# Patient Record
Sex: MALE | Race: WHITE | Employment: FULL TIME | URBAN - METROPOLITAN AREA
[De-identification: names, ages, dates, MRNs, and addresses within clinical notes are randomized per-mention and may not be internally consistent; named-entity substitution may affect disease eponyms.]

---

## 2017-01-31 DIAGNOSIS — J45.30 MILD PERSISTENT ASTHMA, UNCOMPLICATED: Primary | ICD-10-CM

## 2017-01-31 RX ORDER — DEXAMETHASONE 4 MG/1
TABLET ORAL
Qty: 1 INHALER | Refills: 0 | Status: SHIPPED | OUTPATIENT
Start: 2017-01-31 | End: 2017-02-08

## 2017-01-31 NOTE — TELEPHONE ENCOUNTER
Flovent inhaler       Last Written Prescription Date: 10-18-16  Last Fill Quantity: 1, # refills: 0    Last Office Visit with G, P or [facility] prescribing provider:  10-16-15   Future Office Visit:       Date of Last Asthma Action Plan Letter:   Asthma Action Plan Q1 Year    Topic Date Due     Asthma Action Plan - yearly  10/23/2015      Asthma Control Test:   ACT Total Scores 10/16/2015   ACT TOTAL SCORE -   ASTHMA ER VISITS -   ASTHMA HOSPITALIZATIONS -   ACT TOTAL SCORE (Goal Greater than or Equal to 20) 16   In the past 12 months, how many times did you visit the emergency room for your asthma without being admitted to the hospital? 0   In the past 12 months, how many times were you hospitalized overnight because of your asthma? 0       Date of Last Spirometry Test:   No results found for this or any previous visit.

## 2017-01-31 NOTE — TELEPHONE ENCOUNTER
Noted in 10/16/2016 TE by SN that pt needs appointment.  Spoke with pt, appt scheduled:    Next 5 appointments (look out 90 days)     Feb 08, 2017  8:30 AM   Office Visit with Jessika Morales MD   [facility] ([facility])    [address] [facility] [address]   [phone]                Medication is being filled for 1 time refill only due to:  upcoming appt   Sherly SANTANA RN

## 2017-02-08 ENCOUNTER — OFFICE VISIT (OUTPATIENT)
Dept: FAMILY MEDICINE | Facility: CLINIC | Age: 42
End: 2017-02-08
Payer: COMMERCIAL

## 2017-02-08 VITALS
HEART RATE: 76 BPM | DIASTOLIC BLOOD PRESSURE: 104 MMHG | HEIGHT: 68 IN | TEMPERATURE: 96.3 F | SYSTOLIC BLOOD PRESSURE: 142 MMHG | BODY MASS INDEX: 28.43 KG/M2 | OXYGEN SATURATION: 96 % | WEIGHT: 187.6 LBS | RESPIRATION RATE: 12 BRPM

## 2017-02-08 DIAGNOSIS — F10.930 ALCOHOL WITHDRAWAL, UNCOMPLICATED (H): ICD-10-CM

## 2017-02-08 DIAGNOSIS — Z00.01 ENCOUNTER FOR ROUTINE ADULT MEDICAL EXAM WITH ABNORMAL FINDINGS: Primary | ICD-10-CM

## 2017-02-08 DIAGNOSIS — R03.0 ELEVATED BLOOD PRESSURE READING WITHOUT DIAGNOSIS OF HYPERTENSION: ICD-10-CM

## 2017-02-08 DIAGNOSIS — R79.89 ELEVATED LFTS: ICD-10-CM

## 2017-02-08 DIAGNOSIS — F41.9 ANXIETY: ICD-10-CM

## 2017-02-08 DIAGNOSIS — J45.30 MILD PERSISTENT ASTHMA, UNCOMPLICATED: ICD-10-CM

## 2017-02-08 DIAGNOSIS — F33.0 MAJOR DEPRESSIVE DISORDER, RECURRENT EPISODE, MILD (H): ICD-10-CM

## 2017-02-08 DIAGNOSIS — F10.288 ALCOHOL DEPENDENCE WITH OTHER ALCOHOL-INDUCED DISORDER (H): ICD-10-CM

## 2017-02-08 DIAGNOSIS — Z23 FLU VACCINE NEED: ICD-10-CM

## 2017-02-08 LAB
ALBUMIN SERPL-MCNC: 4 G/DL (ref 3.4–5)
ALP SERPL-CCNC: 94 U/L (ref 40–150)
ALT SERPL W P-5'-P-CCNC: 147 U/L (ref 0–70)
ANION GAP SERPL CALCULATED.3IONS-SCNC: 8 MMOL/L (ref 3–14)
AST SERPL W P-5'-P-CCNC: 50 U/L (ref 0–45)
BILIRUB SERPL-MCNC: 0.4 MG/DL (ref 0.2–1.3)
BUN SERPL-MCNC: 15 MG/DL (ref 7–30)
CALCIUM SERPL-MCNC: 9.3 MG/DL (ref 8.5–10.1)
CHLORIDE SERPL-SCNC: 103 MMOL/L (ref 94–109)
CO2 SERPL-SCNC: 29 MMOL/L (ref 20–32)
CREAT SERPL-MCNC: 0.71 MG/DL (ref 0.66–1.25)
GFR SERPL CREATININE-BSD FRML MDRD: ABNORMAL ML/MIN/1.7M2
GGT SERPL-CCNC: 133 U/L (ref 0–75)
GLUCOSE SERPL-MCNC: 107 MG/DL (ref 70–99)
POTASSIUM SERPL-SCNC: 4 MMOL/L (ref 3.4–5.3)
PROT SERPL-MCNC: 7.8 G/DL (ref 6.8–8.8)
SODIUM SERPL-SCNC: 140 MMOL/L (ref 133–144)

## 2017-02-08 PROCEDURE — 90471 IMMUNIZATION ADMIN: CPT | Performed by: FAMILY MEDICINE

## 2017-02-08 PROCEDURE — 99396 PREV VISIT EST AGE 40-64: CPT | Mod: 25 | Performed by: FAMILY MEDICINE

## 2017-02-08 PROCEDURE — 80053 COMPREHEN METABOLIC PANEL: CPT | Performed by: FAMILY MEDICINE

## 2017-02-08 PROCEDURE — 99213 OFFICE O/P EST LOW 20 MIN: CPT | Mod: 25 | Performed by: FAMILY MEDICINE

## 2017-02-08 PROCEDURE — 36415 COLL VENOUS BLD VENIPUNCTURE: CPT | Performed by: FAMILY MEDICINE

## 2017-02-08 PROCEDURE — 90686 IIV4 VACC NO PRSV 0.5 ML IM: CPT | Performed by: FAMILY MEDICINE

## 2017-02-08 PROCEDURE — 82977 ASSAY OF GGT: CPT | Performed by: FAMILY MEDICINE

## 2017-02-08 RX ORDER — FLUTICASONE PROPIONATE 110 UG/1
1 AEROSOL, METERED RESPIRATORY (INHALATION) 2 TIMES DAILY
Qty: 1 INHALER | Refills: 11 | Status: SHIPPED | OUTPATIENT
Start: 2017-02-08 | End: 2017-09-15

## 2017-02-08 RX ORDER — DIAZEPAM 5 MG
2.5 TABLET ORAL DAILY PRN
Qty: 7 TABLET | Refills: 0 | Status: SHIPPED | OUTPATIENT
Start: 2017-02-08 | End: 2017-09-15

## 2017-02-08 RX ORDER — CITALOPRAM HYDROBROMIDE 20 MG/1
20 TABLET ORAL DAILY
Qty: 90 TABLET | Refills: 1 | Status: SHIPPED | OUTPATIENT
Start: 2017-02-08 | End: 2017-08-12

## 2017-02-08 ASSESSMENT — ANXIETY QUESTIONNAIRES
6. BECOMING EASILY ANNOYED OR IRRITABLE: SEVERAL DAYS
5. BEING SO RESTLESS THAT IT IS HARD TO SIT STILL: NOT AT ALL
IF YOU CHECKED OFF ANY PROBLEMS ON THIS QUESTIONNAIRE, HOW DIFFICULT HAVE THESE PROBLEMS MADE IT FOR YOU TO DO YOUR WORK, TAKE CARE OF THINGS AT HOME, OR GET ALONG WITH OTHER PEOPLE: NOT DIFFICULT AT ALL
7. FEELING AFRAID AS IF SOMETHING AWFUL MIGHT HAPPEN: SEVERAL DAYS
1. FEELING NERVOUS, ANXIOUS, OR ON EDGE: SEVERAL DAYS
2. NOT BEING ABLE TO STOP OR CONTROL WORRYING: SEVERAL DAYS
GAD7 TOTAL SCORE: 6
3. WORRYING TOO MUCH ABOUT DIFFERENT THINGS: SEVERAL DAYS

## 2017-02-08 ASSESSMENT — PATIENT HEALTH QUESTIONNAIRE - PHQ9: 5. POOR APPETITE OR OVEREATING: SEVERAL DAYS

## 2017-02-08 NOTE — NURSING NOTE
"Chief Complaint   Patient presents with     Physical     not fasting     /104 mmHg  Pulse 76  Temp(Src) 96.3  F (35.7  C) (Oral)  Resp 12  Ht 5' 7.75\" (1.721 m)  Wt 187 lb 9.6 oz (85.095 kg)  BMI 28.73 kg/m2  SpO2 96% Estimated body mass index is 28.73 kg/(m^2) as calculated from the following:    Height as of this encounter: 5' 7.75\" (1.721 m).    Weight as of this encounter: 187 lb 9.6 oz (85.095 kg).  bp completed using cuff size: large      Health Maintenance that is potentially due pending provider review:  PHQ9 and ACT    Possibly completing today per provider review.  Mala Marquez M.A.    "
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
## 2017-02-08 NOTE — MR AVS SNAPSHOT
After Visit Summary   2/8/2017    Garrett Hays    MRN: [id number]           Patient Information     Date Of Birth          1975        Visit Information        Provider Department      2/8/2017 8:30 AM Jessika Morales MD [facility]        Today's Diagnoses     Encounter for routine adult medical exam with abnormal findings    -  1     Mild persistent asthma, uncomplicated         Alcohol dependence with other alcohol-induced disorder (H)         Elevated LFTs         Anxiety         Major depressive disorder, recurrent episode, mild (H)         Tobacco user         Seizure disorder (H)         Alcohol withdrawal, uncomplicated (H)           Care Instructions      Preventive Health Recommendations  Male Ages 40 to 49    Yearly exam:             See your health care provider every year in order to  o   Review health changes.   o   Discuss preventive care.    o   Review your medicines if your doctor has prescribed any.    You should be tested each year for STDs (sexually transmitted diseases) if you re at risk.     Have a cholesterol test every 5 years.     Have a colonoscopy (test for colon cancer) if someone in your family has had colon cancer or polyps before age 50.     After age 45, have a diabetes test (fasting glucose). If you are at risk for diabetes, you should have this test every 3 years.      Talk with your health care provider about whether or not a prostate cancer screening test (PSA) is right for you.    Shots: Get a flu shot each year. Get a tetanus shot every 10 years.     Nutrition:    Eat at least 5 servings of fruits and vegetables daily.     Eat whole-grain bread, whole-wheat pasta and brown rice instead of white grains and rice.     Talk to your provider about Calcium and Vitamin D.     Lifestyle    Exercise for at least 150 minutes a week (30 minutes a day, 5 days a week). This will help you control your weight and prevent disease.     Limit alcohol to one  "drink per day.     No smoking.     Wear sunscreen to prevent skin cancer.     See your dentist every six months for an exam and cleaning.            Follow-ups after your visit        Who to contact     If you have questions or need follow up information about today's clinic visit or your schedule please contact [facility] directly at [phone].  Normal or non-critical lab and imaging results will be communicated to you by MyChart, letter or phone within 4 business days after the clinic has received the results. If you do not hear from us within 7 days, please contact the clinic through Jett or phone. If you have a critical or abnormal lab result, we will notify you by phone as soon as possible.  Submit refill requests through Piccs[employer] or call your pharmacy and they will forward the refill request to us. Please allow 3 business days for your refill to be completed.          Additional Information About Your Visit        [employer] Information     Piccsy gives you secure access to your electronic health record. If you see a primary care provider, you can also send messages to your care team and make appointments. If you have questions, please call your primary care clinic.  If you do not have a primary care provider, please call [phone] and they will assist you.        Care EveryWhere ID     This is your Care EveryWhere ID. This could be used by other organizations to access your [facility] medical records  [id number]        Your Vitals Were     Pulse Temperature Respirations Height BMI (Body Mass Index) Pulse Oximetry    76 96.3  F (35.7  C) (Oral) 12 5' 7.75\" (1.721 m) 28.73 kg/m2 96%       Blood Pressure from Last 3 Encounters:   02/08/17 142/104   10/16/15 124/74   05/01/15 128/80    Weight from Last 3 Encounters:   02/08/17 187 lb 9.6 oz (85.095 kg)   10/16/15 186 lb 4.8 oz (84.505 kg)   05/01/15 187 lb 8 oz (85.049 kg)              We Performed the Following     Asthma Action Plan (AAP)     " Comprehensive metabolic panel (BMP + Alb, Alk Phos, ALT, AST, Total. Bili, TP)     DEPRESSION ACTION PLAN (DAP)          Today's Medication Changes          These changes are accurate as of: 2/8/17  9:29 AM.  If you have any questions, ask your nurse or doctor.               These medicines have changed or have updated prescriptions.        Dose/Directions    citalopram 20 MG tablet   Commonly known as:  celeXA   This may have changed:  See the new instructions.   Used for:  Anxiety   Changed by:  Jessika Morales MD        Dose:  20 mg   Take 1 tablet (20 mg) by mouth daily   Quantity:  90 tablet   Refills:  1       fluticasone 110 MCG/ACT Inhaler   Commonly known as:  FLOVENT HFA   This may have changed:  See the new instructions.   Used for:  Mild persistent asthma, uncomplicated   Changed by:  Jessika Morales MD        Dose:  1 puff   Inhale 1 puff into the lungs 2 times daily   Quantity:  1 Inhaler   Refills:  11            Where to get your medicines      These medications were sent to [facility] [id number] [facility] - [place], [place] - [address]  [address] [address]     Phone:  [phone]    - citalopram 20 MG tablet  - fluticasone 110 MCG/ACT Inhaler      Some of these will need a paper prescription and others can be bought over the counter.  Ask your nurse if you have questions.     Bring a paper prescription for each of these medications    - diazepam 5 MG tablet             Primary Care Provider Office Phone # Fax #    Megan Carranza [phone]       [facility] [address] [id number]        Thank you!     Thank you for choosing [facility]  for your care. Our goal is always to provide you with excellent care. Hearing back from our patients is one way we can continue to improve our services. Please take a few minutes to complete the written survey that you may receive in the mail after your  visit with us. Thank you!             Your Updated Medication List - Protect others around you: Learn how to safely use, store and throw away your medicines at www.disposemymeds.org.          This list is accurate as of: 2/8/17  9:29 AM.  Always use your most recent med list.                   Brand Name Dispense Instructions for use    citalopram 20 MG tablet    celeXA    90 tablet    Take 1 tablet (20 mg) by mouth daily       diazepam 5 MG tablet    VALIUM    7 tablet    Take 0.5 tablets (2.5 mg) by mouth daily as needed for anxiety or agitation       EPINEPHrine 0.3 MG/0.3ML injection     0.6 mL    Inject 0.3 mLs (0.3 mg) into the muscle as needed for anaphylaxis       fluticasone 110 MCG/ACT Inhaler    FLOVENT HFA    1 Inhaler    Inhale 1 puff into the lungs 2 times daily       omeprazole 20 MG tablet     90 tablet    Take 1 tablet (20 mg) by mouth daily Take 30-60 minutes before a meal.       VENTOLIN  (90 BASE) MCG/ACT Inhaler   Generic drug:  albuterol     1 Inhaler    INHALE ONE OR TWO PUFFS BY MOUTH EVERY SIX HOURS AS NEEDED FOR SHORTNESS OF BREATH

## 2017-02-09 ASSESSMENT — ANXIETY QUESTIONNAIRES: GAD7 TOTAL SCORE: 6

## 2017-02-09 ASSESSMENT — ASTHMA QUESTIONNAIRES: ACT_TOTALSCORE: 24

## 2017-02-09 ASSESSMENT — PATIENT HEALTH QUESTIONNAIRE - PHQ9: SUM OF ALL RESPONSES TO PHQ QUESTIONS 1-9: 5

## 2017-02-09 NOTE — PROGRESS NOTES
Quick Note:    Here are your lab results from your recent visit...  -Your CMP (which includes electrolyte levels, blood sugar levels, and kidney and liver function tests) looks abnormal again with the elevated liver function tests (the ALT and AST). Your GGT, which is another liver test that is specifically elevated with excess alcohol ingestion, is also elevated. Your glucose is also a bit high, but that is likely because you were not fasting at the time of your lab draw?     We can discuss these further at your follow-up appointment.    Eduardo Guadarrama MD  ______

## 2017-02-10 ENCOUNTER — CARE COORDINATION (OUTPATIENT)
Dept: CARE COORDINATION | Facility: CLINIC | Age: 42
End: 2017-02-10

## 2017-02-10 NOTE — PROGRESS NOTES
Clinic Care Coordination Contact  OUTREACH    Referral Information:  Referral Source: PCP  Reason for Contact: initial with focus on patient want info on how to schedule CD assessment  Care Conference: No     Universal Utilization:   ED Visits in last year: 0  Hospital visits in last year: 0  Last PCP appointment: 02/08/17 (not with his PCP)  Missed Appointments: 0  Concerns: none  Multiple Providers or Specialists: none    Clinical Concerns:  Current Medical Concerns: Patient stopped drinking 3 days ago & has not had a drink since. Patient is feeling fine & reports that he feels better then when he has been drinking. Discussed that patient would need to go to ED if he started to feel like his heart is racing, has [place], clammy & etc that patient would need to go to ED immediately. Patient has had none of those symptoms. Patient did go through detox about 20 years ago. He is not feeling like he did at that time.    Current Behavioral Concerns: Patient does want to have support in remaining sober. He has looked at info on AA but is not sure that he wants to attend groups. Discussion that with his stopping drinking is to have CD assessment which will determine what type of program patient would benefit from. Patient would potentially need an outpt program that would be meeing 3 days per week for 3-4 hours per time. Patient does work full time so day program would not work for him. Patient can have CD assessment with [facility] & if [facility] outpt program would not work for his schedule that he will receive info on other programs that would meet his needs. Patient has not discussed with his family his decision not to drink. He stated that his family will be supportive  Education Provided to patient: Discussion of withdrawal symptoms & that to determine what type of program that he needs, that would be done by a CD assessment    Clinical Pathway Name: Depression  Clinical Pathway: not reviewed today as focus was on alcohol use &  treatment    Medication Management:  Not reviewed     Functional Status:  Mobility Status-unknown  Equipment Currently Used at Home:  (unknown)  Transportation: no issues identified           Psychosocial:  Current living arrangement:: Not Assessed  Financial/Insurance: Patient works full time  Patient will contact his health plan to verify coverage for CD assessment     Resources and Interventions:  Current Resources: none  PAS Number:  (n/a)  [facility] Line Referral Placed:  (n/a)  Advanced Care Plans/Directives on file:: No  Referrals Placed: CD ([facility] for CD assessment)     Goals:       Patient stated that he had the info that he needed & did not want any further follow up by Clinic Care CoordinatorDONTE             Barriers: none noted  Strengths: Patient has committed to not drinking by not drinking for past 3 days  Patient/Caregiver understanding: Patient is aware that he needs CD assessment to verify what level of CD treatment her needs & that he can schedule a CD assessment with  by calling [phone]  Frequency of Care Coordination: as needed  Upcoming appointment: 02/22/17     Plan: Patient is to call  at [phone] to schedule CD assessment  Patient can outreach to Clinic Care Coordinator, DONTE in the future if he is need of additional info & resources  AZAM Weinberg, [facility]  Clinic Care Coordinator, DONTE with  [facility]  [phone]

## 2017-02-10 NOTE — PROGRESS NOTES
Clinic Care Coordination Contact  [facility]/Voicemail       Clinical Data: Care Coordinator Outreach    Patient in to be seen on 02/08/2017 by provider other then his PCP.  Patient referred related to his request for treatment option related to alcohol use.  Screening for alcohol use was 14 for this patient & anything over 8 is a disorder    Unknown if patient has done treatment in plast.  Phone # to schedule outpt CD eval is [phone]      Outreach attempted x 1.  Left message on voicemail with call back information and requested return call.  Plan: . Care Coordinator will try to reach patient again in 3-5 business days.  AZAM Weinberg, [facility]  Clinic Care Coordinator,  with [employer]  [phone]

## 2017-02-22 ENCOUNTER — OFFICE VISIT (OUTPATIENT)
Dept: FAMILY MEDICINE | Facility: CLINIC | Age: 42
End: 2017-02-22
Payer: COMMERCIAL

## 2017-02-22 VITALS
DIASTOLIC BLOOD PRESSURE: 84 MMHG | RESPIRATION RATE: 15 BRPM | HEIGHT: 68 IN | TEMPERATURE: 97 F | BODY MASS INDEX: 28.2 KG/M2 | SYSTOLIC BLOOD PRESSURE: 127 MMHG | WEIGHT: 186.1 LBS | OXYGEN SATURATION: 96 % | HEART RATE: 82 BPM

## 2017-02-22 DIAGNOSIS — R79.89 ELEVATED LFTS: ICD-10-CM

## 2017-02-22 DIAGNOSIS — F10.21 ALCOHOL DEPENDENCE IN REMISSION (H): Primary | ICD-10-CM

## 2017-02-22 LAB
ALBUMIN SERPL-MCNC: 4.3 G/DL (ref 3.4–5)
ALP SERPL-CCNC: 79 U/L (ref 40–150)
ALT SERPL W P-5'-P-CCNC: 129 U/L (ref 0–70)
ANION GAP SERPL CALCULATED.3IONS-SCNC: 8 MMOL/L (ref 3–14)
AST SERPL W P-5'-P-CCNC: 42 U/L (ref 0–45)
BILIRUB SERPL-MCNC: 0.4 MG/DL (ref 0.2–1.3)
BUN SERPL-MCNC: 16 MG/DL (ref 7–30)
CALCIUM SERPL-MCNC: 9.5 MG/DL (ref 8.5–10.1)
CHLORIDE SERPL-SCNC: 105 MMOL/L (ref 94–109)
CO2 SERPL-SCNC: 27 MMOL/L (ref 20–32)
CREAT SERPL-MCNC: 0.94 MG/DL (ref 0.66–1.25)
GFR SERPL CREATININE-BSD FRML MDRD: 89 ML/MIN/1.7M2
GGT SERPL-CCNC: 89 U/L (ref 0–75)
GLUCOSE SERPL-MCNC: 94 MG/DL (ref 70–99)
POTASSIUM SERPL-SCNC: 4.4 MMOL/L (ref 3.4–5.3)
PROT SERPL-MCNC: 8.2 G/DL (ref 6.8–8.8)
SODIUM SERPL-SCNC: 140 MMOL/L (ref 133–144)

## 2017-02-22 PROCEDURE — 82977 ASSAY OF GGT: CPT | Performed by: FAMILY MEDICINE

## 2017-02-22 PROCEDURE — 80053 COMPREHEN METABOLIC PANEL: CPT | Performed by: FAMILY MEDICINE

## 2017-02-22 PROCEDURE — 99214 OFFICE O/P EST MOD 30 MIN: CPT | Performed by: FAMILY MEDICINE

## 2017-02-22 PROCEDURE — 36415 COLL VENOUS BLD VENIPUNCTURE: CPT | Performed by: FAMILY MEDICINE

## 2017-02-22 ASSESSMENT — ANXIETY QUESTIONNAIRES
6. BECOMING EASILY ANNOYED OR IRRITABLE: NOT AT ALL
2. NOT BEING ABLE TO STOP OR CONTROL WORRYING: SEVERAL DAYS
1. FEELING NERVOUS, ANXIOUS, OR ON EDGE: SEVERAL DAYS
GAD7 TOTAL SCORE: 3
3. WORRYING TOO MUCH ABOUT DIFFERENT THINGS: SEVERAL DAYS
IF YOU CHECKED OFF ANY PROBLEMS ON THIS QUESTIONNAIRE, HOW DIFFICULT HAVE THESE PROBLEMS MADE IT FOR YOU TO DO YOUR WORK, TAKE CARE OF THINGS AT HOME, OR GET ALONG WITH OTHER PEOPLE: NOT DIFFICULT AT ALL
7. FEELING AFRAID AS IF SOMETHING AWFUL MIGHT HAPPEN: NOT AT ALL
5. BEING SO RESTLESS THAT IT IS HARD TO SIT STILL: NOT AT ALL

## 2017-02-22 ASSESSMENT — PATIENT HEALTH QUESTIONNAIRE - PHQ9: 5. POOR APPETITE OR OVEREATING: NOT AT ALL

## 2017-02-22 NOTE — PROGRESS NOTES
SUBJECTIVE:                                                    Garrett Hays is a 41 year old male who presents to clinic today for the following health issues:    Depression and Anxiety Follow-Up/Citalopram 20 mg/diazepam 5mg    Status since last visit: under control     Other associated symptoms:None    Complicating factors:     Significant life event: No     Current substance abuse: None    PHQ-9 SCORE 10/23/2014 5/1/2015 2/8/2017   Total Score 0 4 -   Total Score - - 5     GUSTABO-7 SCORE 2/8/2017   Total Score 6        PHQ-9  English      PHQ-9   Any Language     GAD7       Amount of exercise or physical activity: not much right now.    Problems taking medications regularly: No    Medication side effects: none  Diet: regular (no restrictions)  Medication Followup of diazepam 5 mg    Taking Medication as prescribed: yes    Side Effects:  None    Medication Helping Symptoms:  yes     Etoh- no alcohol since last visit.   Feels much better.  No more acid reflux, sleeping soundly through the night.  Hasn't felt like this in years.  Taking it one day at a time now.  Feeling so good to sleep through, and feel refreshed.  Iced coffee is his thing.  Wife still drinks, not bothering him.  Even went out with friends to bars, and just had a coke, felt good.    Already met with [facility]- went well.    Will try and do assessment to see what txt options/services are appropriate.  Hasn't really thought about AA.    Valium- did 1/2 tab at night when he came home from work.  Helped.  Doesn't think he needs any more.  Didn't have any really w/d sx's, just one day of a bit of panic, sweating (day or two after stopping).    Is fasting today.      Patient Active Problem List   Diagnosis     CARDIOVASCULAR SCREENING; LDL GOAL LESS THAN 160     Anxiety     Seizure disorder (H)     Elevated LFTs     Mild persistent asthma, uncomplicated     Gastroesophageal reflux disease without esophagitis     Major depressive disorder, recurrent  episode, mild (H)     Bee sting allergy     Past Surgical History   Procedure Laterality Date     Tonsillectomy  3-4 years old       Social History   Substance Use Topics     Smoking status: Former Smoker     Quit date: 10/14/2013     Smokeless tobacco: Never Used      Comment: 6-7 cig/day     Alcohol use 0.0 oz/week     0 Standard drinks or equivalent per week      Comment: Couple glass of wine per week.     Family History   Problem Relation Age of Onset     Alcohol/Drug Father      Allergies Mother      Depression Mother      Depression Father      Eye Disorder Mother      Can't see well out of her left eye,      Myocardial Infarction Maternal Grandfather       age 49         Current Outpatient Prescriptions   Medication Sig Dispense Refill     fluticasone (FLOVENT HFA) 110 MCG/ACT Inhaler Inhale 1 puff into the lungs 2 times daily 1 Inhaler 11     citalopram (CELEXA) 20 MG tablet Take 1 tablet (20 mg) by mouth daily 90 tablet 1     diazepam (VALIUM) 5 MG tablet Take 0.5 tablets (2.5 mg) by mouth daily as needed for anxiety or agitation 7 tablet 0     VENTOLIN  (90 BASE) MCG/ACT inhaler INHALE ONE OR TWO PUFFS BY MOUTH EVERY SIX HOURS AS NEEDED FOR SHORTNESS OF BREATH 1 Inhaler 0     EPINEPHrine (EPIPEN) 0.3 MG/0.3ML injection Inject 0.3 mLs (0.3 mg) into the muscle as needed for anaphylaxis 0.6 mL 1     omeprazole 20 MG tablet Take 1 tablet (20 mg) by mouth daily Take 30-60 minutes before a meal. 90 tablet 1     Allergies   Allergen Reactions     Aspirin      Was young when he had the reastion, not sure what happens.     Bee Venom      Cats      Recent Labs   Lab Test  17   0952  10/16/15   1347  12   0849  12   1605   11   1114   LDL   --    --   117   --    --   142*   HDL   --    --   50   --    --   52   TRIG   --    --   226*   --    --   237*   ALT  147*  262*  103*  179*   < >   --    CR  0.71   --    --   0.87   --    --    GFRESTIMATED  >90  Non  GFR  "Calc     --    --   >90   --    --    GFRESTBLACK  >90   GFR Calc     --    --   >90   --    --    POTASSIUM  4.0   --    --   4.2   --    --     < > = values in this interval not displayed.      BP Readings from Last 3 Encounters:   02/22/17 127/84   02/08/17 (!) 142/104   10/16/15 124/74    Wt Readings from Last 3 Encounters:   02/22/17 186 lb 1.6 oz (84.4 kg)   02/08/17 187 lb 9.6 oz (85.1 kg)   10/16/15 186 lb 4.8 oz (84.5 kg)                  Labs reviewed in EPIC  Problem list, Medication list, Allergies, and Medical/Social/Surgical histories reviewed in [facility] and updated as appropriate.    ROS:  Constitutional, HEENT, cardiovascular, pulmonary, gi and gu systems are negative, except as otherwise noted.    OBJECTIVE:                                                    /84 (BP Location: Right arm, Patient Position: Chair, Cuff Size: Adult Large)  Pulse 82  Temp 97  F (36.1  C) (Oral)  Resp 15  Ht 5' 7.75\" (1.721 m)  Wt 186 lb 1.6 oz (84.4 kg)  SpO2 96%  BMI 28.51 kg/m2  Body mass index is 28.51 kg/(m^2).  GENERAL APPEARANCE: healthy, alert and no distress     EYES: PERRL, sclera clear     HENT: nose and mouth without ulcers or lesions     NECK: no adenopathy, no asymmetry, masses, or scars and thyroid normal to palpation     RESP: lungs clear to auscultation - no rales, rhonchi or wheezes     CV: regular rates and rhythm, normal S1 S2, no S3 or S4 and no murmur, click or rub      Abdomen: soft, nontender, no HSM or masses and bowel sounds normal     Ext: warm, dry, no edema      Psych: full range affect, normal speech and grooming, judgement and insight intact     Diagnostic Test Results:  Results for orders placed or performed in visit on 02/22/17   Comprehensive metabolic panel (BMP + Alb, Alk Phos, ALT, AST, Total. Bili, TP)   Result Value Ref Range    Sodium 140 133 - 144 mmol/L    Potassium 4.4 3.4 - 5.3 mmol/L    Chloride 105 94 - 109 mmol/L    Carbon Dioxide 27 20 - 32 mmol/L    Anion " Gap 8 3 - 14 mmol/L    Glucose 94 70 - 99 mg/dL    Urea Nitrogen 16 7 - 30 mg/dL    Creatinine 0.94 0.66 - 1.25 mg/dL    GFR Estimate 89 >60 mL/min/1.7m2    GFR Estimate If Black >90   GFR Calc   >60 mL/min/1.7m2    Calcium 9.5 8.5 - 10.1 mg/dL    Bilirubin Total 0.4 0.2 - 1.3 mg/dL    Albumin 4.3 3.4 - 5.0 g/dL    Protein Total 8.2 6.8 - 8.8 g/dL    Alkaline Phosphatase 79 40 - 150 U/L     (H) 0 - 70 U/L    AST 42 0 - 45 U/L   GGT   Result Value Ref Range    GGT 89 (H) 0 - 75 U/L        ASSESSMENT/PLAN:                                                        ICD-10-CM    1. Alcohol dependence in remission (H) F10.21 Comprehensive metabolic panel (BMP + Alb, Alk Phos, ALT, AST, Total. Bili, TP)     GGT   2. Elevated LFTs R79.89 Comprehensive metabolic panel (BMP + Alb, Alk Phos, ALT, AST, Total. Bili, TP)     GGT     Pt has had long h/o alcohol dependence vs abuse, but has had no alcohol since his last visit ~2 wks ago!   Was feeling ready to quit, and is feeling great because of it.  Feels as good as he has in years- sleeping well, feeling rested.  Diazepam #7 given per his request- he took the 1/2 tab of diazepam after work for daily, but doesn't think he needs any more.    Had minor w/d sx's 1-2 days after stopping, but none after that.  His wife is still drinking, and he went out to the bars with friends and felt fine being around them and not drinking.    From last visit, reviewed that his LFTs were high as usual, and his GGT was elevated, suggesting etoh as cause.  Glucose was high, but he was not fasting- is fasting today.    Pt states he has no urge to drink, and is taking it day-by-day.  Does not sound very motivated to do txt/support, but is open to the suggestions.  Discussed most people need help to be able to avoid slipping back into alcohol abuse. He states he'll do the etoh assessment, and try and find some AA meetings that work for him.    Rec f/u appt in 2-3 months for f/u.   Will plan to recheck labs today (fasting) and at that appointment to see if labs improve as additional motivation.  Pt agrees with plan.  Will call or rtc sooner if cravings increase or needs additional assistance.      Jessika Morales MD  [facility]

## 2017-02-22 NOTE — MR AVS SNAPSHOT
"              After Visit Summary   2/22/2017    Garrett Hays    MRN: [id number]           Patient Information     Date Of Birth          1975        Visit Information        Provider Department      2/22/2017 8:30 AM Jessika Morales MD [facility]        Today's Diagnoses     Alcohol dependence in remission (H)    -  1    Elevated LFTs           Follow-ups after your visit        Who to contact     If you have questions or need follow up information about today's clinic visit or your schedule please contact [facility] directly at [phone].  Normal or non-critical lab and imaging results will be communicated to you by [employer], letter or phone within 4 business days after the clinic has received the results. If you do not hear from us within 7 days, please contact the clinic through [employer] or phone. If you have a critical or abnormal lab result, we will notify you by phone as soon as possible.  Submit refill requests through [employer] or call your pharmacy and they will forward the refill request to us. Please allow 3 business days for your refill to be completed.          Additional Information About Your Visit        MyChart Information     [employer] gives you secure access to your electronic health record. If you see a primary care provider, you can also send messages to your care team and make appointments. If you have questions, please call your primary care clinic.  If you do not have a primary care provider, please call [phone] and they will assist you.        Care EveryWhere ID     This is your Care EveryWhere ID. This could be used by other organizations to access your [facility] medical records  [id number]        Your Vitals Were     Pulse Temperature Respirations Height Pulse Oximetry BMI (Body Mass Index)    82 97  F (36.1  C) (Oral) 15 5' 7.75\" (1.721 m) 96% 28.51 kg/m2       Blood Pressure from Last 3 Encounters:   02/22/17 127/84   02/08/17 (!) 142/104 "   10/16/15 124/74    Weight from Last 3 Encounters:   02/22/17 186 lb 1.6 oz (84.4 kg)   02/08/17 187 lb 9.6 oz (85.1 kg)   10/16/15 186 lb 4.8 oz (84.5 kg)              We Performed the Following     Comprehensive metabolic panel (BMP + Alb, Alk Phos, ALT, AST, Total. Bili, TP)     GGT        Primary Care Provider Office Phone # Fax #    [place] Carranza [phone]       [facility] [address]  [facility] [address]        Thank you!     Thank you for choosing [facility]  for your care. Our goal is always to provide you with excellent care. Hearing back from our patients is one way we can continue to improve our services. Please take a few minutes to complete the written survey that you may receive in the mail after your visit with us. Thank you!             Your Updated Medication List - Protect others around you: Learn how to safely use, store and throw away your medicines at www.disposemymeds.org.          This list is accurate as of: 2/22/17 11:59 PM.  Always use your most recent med list.                   Brand Name Dispense Instructions for use    citalopram 20 MG tablet    celeXA    90 tablet    Take 1 tablet (20 mg) by mouth daily       diazepam 5 MG tablet    VALIUM    7 tablet    Take 0.5 tablets (2.5 mg) by mouth daily as needed for anxiety or agitation       EPINEPHrine 0.3 MG/0.3ML injection     0.6 mL    Inject 0.3 mLs (0.3 mg) into the muscle as needed for anaphylaxis       fluticasone 110 MCG/ACT Inhaler    FLOVENT HFA    1 Inhaler    Inhale 1 puff into the lungs 2 times daily       omeprazole 20 MG tablet     90 tablet    Take 1 tablet (20 mg) by mouth daily Take 30-60 minutes before a meal.       VENTOLIN  (90 BASE) MCG/ACT Inhaler   Generic drug:  albuterol     1 Inhaler    INHALE ONE OR TWO PUFFS BY MOUTH EVERY SIX HOURS AS NEEDED FOR SHORTNESS OF BREATH

## 2017-02-23 ASSESSMENT — PATIENT HEALTH QUESTIONNAIRE - PHQ9: SUM OF ALL RESPONSES TO PHQ QUESTIONS 1-9: 0

## 2017-02-23 ASSESSMENT — ANXIETY QUESTIONNAIRES: GAD7 TOTAL SCORE: 3

## 2017-02-23 NOTE — PROGRESS NOTES
Here are your lab results from your recent visit...  Good news.  Your liver function tests are all improved (though not all normal yet) with just a short time off the alcohol.  We should check these again when you return to see if they completely normalize with a longer time off alcohol.    Please let me know if you have any questions.  Best,   Eduardo Morales MD

## 2017-04-01 DIAGNOSIS — J45.30 MILD PERSISTENT ASTHMA, UNCOMPLICATED: ICD-10-CM

## 2017-04-03 RX ORDER — DEXAMETHASONE 4 MG/1
TABLET ORAL
Start: 2017-04-03

## 2017-04-03 NOTE — TELEPHONE ENCOUNTER
Flovent HFA        Last Written Prescription Date: 02/08/2017  Last Fill Quantity: 1, # refills: 11    Last Office Visit with [facility], [facility] or [facility] prescribing provider:  02/22/2017   Future Office Visit:       Date of Last Asthma Action Plan Letter:   Asthma Action Plan Q1 Year    Topic Date Due     Asthma Action Plan - yearly  02/08/2018      Asthma Control Test:   ACT Total Scores 2/8/2017   ACT TOTAL SCORE -   ASTHMA ER VISITS -   ASTHMA HOSPITALIZATIONS -   ACT TOTAL SCORE (Goal Greater than or Equal to 20) 24   In the past 12 months, how many times did you visit the emergency room for your asthma without being admitted to the hospital? 0   In the past 12 months, how many times were you hospitalized overnight because of your asthma? 0       Date of Last Spirometry Test:   No results found for this or any previous visit.

## 2017-04-09 DIAGNOSIS — J45.30 MILD PERSISTENT ASTHMA, UNCOMPLICATED: ICD-10-CM

## 2017-04-10 NOTE — TELEPHONE ENCOUNTER
Flovent HFA        Last Written Prescription Date: 02/08/2017  Last Fill Quantity: 1, # refills: 11    Last Office Visit with [facility], [facility] or [facility] prescribing provider:  02/22/2017   Future Office Visit:       Date of Last Asthma Action Plan Letter:   Asthma Action Plan Q1 Year    Topic Date Due     Asthma Action Plan - yearly  02/08/2018      Asthma Control Test:   ACT Total Scores 2/8/2017   ACT TOTAL SCORE -   ASTHMA ER VISITS -   ASTHMA HOSPITALIZATIONS -   ACT TOTAL SCORE (Goal Greater than or Equal to 20) 24   In the past 12 months, how many times did you visit the emergency room for your asthma without being admitted to the hospital? 0   In the past 12 months, how many times were you hospitalized overnight because of your asthma? 0       Date of Last Spirometry Test:   No results found for this or any previous visit.

## 2017-04-11 RX ORDER — DEXAMETHASONE 4 MG/1
TABLET ORAL
Refills: 0
Start: 2017-04-11

## 2017-04-22 DIAGNOSIS — J45.30 MILD PERSISTENT ASTHMA, UNCOMPLICATED: ICD-10-CM

## 2017-04-24 RX ORDER — DEXAMETHASONE 4 MG/1
TABLET ORAL
Start: 2017-04-24

## 2017-04-24 NOTE — TELEPHONE ENCOUNTER
Denied  Refill request too early; Rx sent 2/8/2017 for 1 year supply  Sherly SANTANA RN    Pending Prescriptions:                       Disp   Refills    FLOVENT  MCG/ACT Inhaler [Pharmacy *12 Inh*0        Sig: INHALE ONE PUFF BY MOUTH TWICE DAILY         Last Written Prescription Date: 2/8/2017  Last Fill Quantity: 1, # refills: 11    Last [facility] Visit with [facility] or [facility] prescribing provider:  2/22/2017   Future Office Visit:       Date of Last Asthma Action Plan Letter:   Asthma Action Plan Q1 Year    Topic Date Due     Asthma Action Plan - yearly  02/08/2018      Asthma Control Test:   ACT Total Scores 2/8/2017   ACT TOTAL SCORE -   ASTHMA ER VISITS -   ASTHMA HOSPITALIZATIONS -   ACT TOTAL SCORE (Goal Greater than or Equal to 20) 24   In the past 12 months, how many times did you visit the emergency room for your asthma without being admitted to the hospital? 0   In the past 12 months, how many times were you hospitalized overnight because of your asthma? 0       Date of Last Spirometry Test:   No results found for this or any previous visit.

## 2017-08-12 DIAGNOSIS — F41.9 ANXIETY: ICD-10-CM

## 2017-08-12 DIAGNOSIS — F33.0 MAJOR DEPRESSIVE DISORDER, RECURRENT EPISODE, MILD (H): ICD-10-CM

## 2017-08-12 NOTE — TELEPHONE ENCOUNTER
Celexa     Last Written Prescription Date: 02/08/2017  Last Fill Quantity: 90, # refills: 1  Last Office Visit with [facility] primary care provider:  02/22/2017        Last PHQ-9 score on record=   PHQ-9 SCORE 2/22/2017   Total Score -   Total Score 0

## 2017-08-14 ENCOUNTER — MYC REFILL (OUTPATIENT)
Dept: FAMILY MEDICINE | Facility: CLINIC | Age: 42
End: 2017-08-14

## 2017-08-14 DIAGNOSIS — J45.30 MILD PERSISTENT ASTHMA, UNCOMPLICATED: ICD-10-CM

## 2017-08-14 RX ORDER — CITALOPRAM HYDROBROMIDE 20 MG/1
TABLET ORAL
Qty: 30 TABLET | Refills: 0 | Status: SHIPPED | OUTPATIENT
Start: 2017-08-14 | End: 2017-09-15

## 2017-08-14 RX ORDER — ALBUTEROL SULFATE 90 UG/1
1-2 AEROSOL, METERED RESPIRATORY (INHALATION) EVERY 6 HOURS PRN
Qty: 1 INHALER | Refills: 0 | Status: SHIPPED | OUTPATIENT
Start: 2017-08-14 | End: 2017-09-15

## 2017-08-14 NOTE — TELEPHONE ENCOUNTER
Next 5 appointments (look out 90 days)     Sep 08, 2017  2:00 PM CDT   Office Visit with Jessika Morales MD   [facility]    [address] [id number] [phone]                Medication is being filled for 1 time refill only due to:  upcoming appt   Sherly SANTANA RN

## 2017-08-14 NOTE — TELEPHONE ENCOUNTER
Message from [facility]:  Original authorizing provider: MD Garrett Esquivel would like a refill of the following medications:  VENTOLIN  (90 BASE) MCG/ACT inhaler [Megan Carranza MD]    Preferred pharmacy: [facility] [address] [place], [place] - [address]    Comment:  Hello, I was wondering if i could get a refill on the VENTOLIN  (90 BASE) MCG/ACT Inhaler. I'm heading out to [facility] on the 18th of this month and I just want to be on the safe side. Would I be able to get it by Friday? Thanks! Garrett

## 2017-09-14 DIAGNOSIS — F41.9 ANXIETY: ICD-10-CM

## 2017-09-14 DIAGNOSIS — F33.0 MAJOR DEPRESSIVE DISORDER, RECURRENT EPISODE, MILD (H): ICD-10-CM

## 2017-09-14 RX ORDER — CITALOPRAM HYDROBROMIDE 20 MG/1
TABLET ORAL
Start: 2017-09-14

## 2017-09-14 NOTE — TELEPHONE ENCOUNTER
Citalopram     Last Written Prescription Date: 8-14-17  Notes to Pharmacy: 1 month refill only; further refills will be sent at upcoming appointment  Last Fill Quantity: 30, # refills: 0  Last Office Visit with [facility] primary care provider:  2-22-17   Next 5 appointments (look out 90 days)     Sep 15, 2017  3:30 PM CDT   Office Visit with Jessika Morales MD   [facility]    [address]  [facility] [address] [phone]                   Last PHQ-9 score on record=   PHQ-9 SCORE 2/22/2017   Total Score -   Total Score 0

## 2017-09-15 ENCOUNTER — OFFICE VISIT (OUTPATIENT)
Dept: FAMILY MEDICINE | Facility: CLINIC | Age: 42
End: 2017-09-15
Payer: COMMERCIAL

## 2017-09-15 VITALS
HEIGHT: 68 IN | SYSTOLIC BLOOD PRESSURE: 124 MMHG | BODY MASS INDEX: 27.92 KG/M2 | HEART RATE: 84 BPM | WEIGHT: 184.2 LBS | OXYGEN SATURATION: 98 % | DIASTOLIC BLOOD PRESSURE: 86 MMHG

## 2017-09-15 DIAGNOSIS — R79.89 ELEVATED LFTS: ICD-10-CM

## 2017-09-15 DIAGNOSIS — F41.9 ANXIETY: Primary | ICD-10-CM

## 2017-09-15 DIAGNOSIS — Z13.6 CARDIOVASCULAR SCREENING; LDL GOAL LESS THAN 160: ICD-10-CM

## 2017-09-15 DIAGNOSIS — Z91.030 BEE STING ALLERGY: ICD-10-CM

## 2017-09-15 DIAGNOSIS — K21.9 GASTROESOPHAGEAL REFLUX DISEASE WITHOUT ESOPHAGITIS: ICD-10-CM

## 2017-09-15 DIAGNOSIS — F33.0 MAJOR DEPRESSIVE DISORDER, RECURRENT EPISODE, MILD (H): ICD-10-CM

## 2017-09-15 DIAGNOSIS — F10.21 HISTORY OF ALCOHOL DEPENDENCE (H): ICD-10-CM

## 2017-09-15 DIAGNOSIS — J45.30 MILD PERSISTENT ASTHMA, UNCOMPLICATED: ICD-10-CM

## 2017-09-15 PROCEDURE — 99215 OFFICE O/P EST HI 40 MIN: CPT | Performed by: FAMILY MEDICINE

## 2017-09-15 RX ORDER — ALBUTEROL SULFATE 90 UG/1
1-2 AEROSOL, METERED RESPIRATORY (INHALATION) EVERY 6 HOURS PRN
Qty: 1 INHALER | Refills: 3 | Status: SHIPPED | OUTPATIENT
Start: 2017-09-15 | End: 2018-07-17

## 2017-09-15 RX ORDER — EPINEPHRINE 0.3 MG/.3ML
0.3 INJECTION SUBCUTANEOUS PRN
Qty: 0.6 ML | Refills: 1 | Status: SHIPPED | OUTPATIENT
Start: 2017-09-15

## 2017-09-15 RX ORDER — FLUTICASONE PROPIONATE 110 UG/1
1 AEROSOL, METERED RESPIRATORY (INHALATION) 2 TIMES DAILY
Qty: 1 INHALER | Refills: 11 | Status: SHIPPED | OUTPATIENT
Start: 2017-09-15 | End: 2018-07-17

## 2017-09-15 RX ORDER — CITALOPRAM HYDROBROMIDE 20 MG/1
20 TABLET ORAL DAILY
Qty: 90 TABLET | Refills: 1 | Status: SHIPPED | OUTPATIENT
Start: 2017-09-15 | End: 2018-06-25

## 2017-09-15 ASSESSMENT — ANXIETY QUESTIONNAIRES
2. NOT BEING ABLE TO STOP OR CONTROL WORRYING: SEVERAL DAYS
3. WORRYING TOO MUCH ABOUT DIFFERENT THINGS: SEVERAL DAYS
IF YOU CHECKED OFF ANY PROBLEMS ON THIS QUESTIONNAIRE, HOW DIFFICULT HAVE THESE PROBLEMS MADE IT FOR YOU TO DO YOUR WORK, TAKE CARE OF THINGS AT HOME, OR GET ALONG WITH OTHER PEOPLE: SOMEWHAT DIFFICULT
1. FEELING NERVOUS, ANXIOUS, OR ON EDGE: SEVERAL DAYS
7. FEELING AFRAID AS IF SOMETHING AWFUL MIGHT HAPPEN: SEVERAL DAYS
5. BEING SO RESTLESS THAT IT IS HARD TO SIT STILL: NOT AT ALL
GAD7 TOTAL SCORE: 6
6. BECOMING EASILY ANNOYED OR IRRITABLE: SEVERAL DAYS

## 2017-09-15 ASSESSMENT — PATIENT HEALTH QUESTIONNAIRE - PHQ9
5. POOR APPETITE OR OVEREATING: SEVERAL DAYS
SUM OF ALL RESPONSES TO PHQ QUESTIONS 1-9: 6
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
## 2017-09-15 NOTE — MR AVS SNAPSHOT
After Visit Summary   9/15/2017    Garrett Hays    MRN: [id number]           Patient Information     Date Of Birth          1975        Visit Information        Provider Department      9/15/2017 3:30 PM Jessika Morales MD [facility]        Today's Diagnoses     Anxiety    -  1    Major depressive disorder, recurrent episode, mild (H)        Mild persistent asthma, uncomplicated        Bee sting allergy        Elevated LFTs        Gastroesophageal reflux disease without esophagitis        History of alcohol dependence (H)        CARDIOVASCULAR SCREENING; LDL GOAL LESS THAN 160          Care Instructions    Sleep aid options--  Unisom tablets (allows you to take just part of the pill).  Melatonin (more natural), dose can range from 1mg to 10mg.          Follow-ups after your visit        Future tests that were ordered for you today     Open Future Orders        Priority Expected Expires Ordered    Lipid panel reflex to direct LDL Routine 3/15/2018 8/15/2018 9/15/2017    Comprehensive metabolic panel (BMP + Alb, Alk Phos, ALT, AST, Total. Bili, TP) Routine 3/15/2018 8/15/2018 9/15/2017            Who to contact     If you have questions or need follow up information about today's clinic visit or your schedule please contact [facility] directly at [phone].  Normal or non-critical lab and imaging results will be communicated to you by MyChart, letter or phone within 4 business days after the clinic has received the results. If you do not hear from us within 7 days, please contact the clinic through MyChart or phone. If you have a critical or abnormal lab result, we will notify you by phone as soon as possible.  Submit refill requests through [employer] or call your pharmacy and they will forward the refill request to us. Please allow 3 business days for your refill to be completed.          Additional Information About Your Visit        MyChart Information      "[employer] gives you secure access to your electronic health record. If you see a primary care provider, you can also send messages to your care team and make appointments. If you have questions, please call your primary care clinic.  If you do not have a primary care provider, please call [phone] and they will assist you.        Care EveryWhere ID     This is your Care EveryWhere ID. This could be used by other organizations to access your [facility] medical records  [id number]        Your Vitals Were     Pulse Height Pulse Oximetry BMI (Body Mass Index)          84 5' 7.75\" (1.721 m) 98% 28.21 kg/m2         Blood Pressure from Last 3 Encounters:   09/15/17 124/86   02/22/17 127/84   02/08/17 (!) 142/104    Weight from Last 3 Encounters:   09/15/17 184 lb 3.2 oz (83.6 kg)   02/22/17 186 lb 1.6 oz (84.4 kg)   02/08/17 187 lb 9.6 oz (85.1 kg)              We Performed the Following     Comprehensive metabolic panel (BMP + Alb, Alk Phos, ALT, AST, Total. Bili, TP)          Today's Medication Changes          These changes are accurate as of: 9/15/17  4:13 PM.  If you have any questions, ask your nurse or doctor.               These medicines have changed or have updated prescriptions.        Dose/Directions    citalopram 20 MG tablet   Commonly known as:  celeXA   This may have changed:  See the new instructions.   Used for:  Anxiety, Major depressive disorder, recurrent episode, mild (H)   Changed by:  Jessika Morales MD        Dose:  20 mg   Take 1 tablet (20 mg) by mouth daily   Quantity:  90 tablet   Refills:  1         Stop taking these medicines if you haven't already. Please contact your care team if you have questions.     diazepam 5 MG tablet   Commonly known as:  VALIUM   Stopped by:  Jessika Morales MD                Where to get your medicines      These medications were sent to [facility] [id number] [place] [facility] [place], [place] - [address]  [address] [facility] [address]    "  Phone:  [phone]     albuterol 108 (90 BASE) MCG/ACT Inhaler    citalopram 20 MG tablet    EPINEPHrine 0.3 MG/0.3ML injection 2-pack    fluticasone 110 MCG/ACT Inhaler                Primary Care Provider Office Phone # Fax #    Jessika Morales [phone]       [address] [address]        Equal Access to Services     [facility] : Hadii aad ku hadasho Soomaali, waaxda luqadaha, qaybta kaalmada adeegyada, waxay idiin hayaan adeeg khmohindersh laurban . So [facility] [phone].    ATENCIÓN: Si stephanie howell, tiene a neff disposición servicios gratuitos de asistencia lingüística. [facility] [phone].    We comply with applicable federal civil rights laws and [place] laws. We do not discriminate on the basis of race, color, national origin, age, disability sex, sexual orientation or gender identity.            Thank you!     Thank you for choosing [facility]  for your care. Our goal is always to provide you with excellent care. Hearing back from our patients is one way we can continue to improve our services. Please take a few minutes to complete the written survey that you may receive in the mail after your visit with us. Thank you!             Your Updated Medication List - Protect others around you: Learn how to safely use, store and throw away your medicines at www.disposemymeds.org.          This list is accurate as of: 9/15/17  4:13 PM.  Always use your most recent med list.                   Brand Name Dispense Instructions for use Diagnosis    albuterol 108 (90 BASE) MCG/ACT Inhaler    VENTOLIN HFA    1 Inhaler    Inhale 1-2 puffs into the lungs every 6 hours as needed for shortness of breath / dyspnea or wheezing    Mild persistent asthma, uncomplicated       citalopram 20 MG tablet    celeXA    90 tablet    Take 1 tablet (20 mg) by mouth daily    Anxiety, Major depressive disorder, recurrent episode, mild (H)       EPINEPHrine 0.3 MG/0.3ML  injection 2-pack    EPIPEN/ADRENACLICK/or ANY BX GENERIC EQUIV    0.6 mL    Inject 0.3 mLs (0.3 mg) into the muscle as needed for anaphylaxis    Bee sting allergy       fluticasone 110 MCG/ACT Inhaler    FLOVENT HFA    1 Inhaler    Inhale 1 puff into the lungs 2 times daily    Mild persistent asthma, uncomplicated       omeprazole 20 MG tablet     90 tablet    Take 1 tablet (20 mg) by mouth daily Take 30-60 minutes before a meal.    Gastroesophageal reflux disease without esophagitis

## 2017-09-16 ASSESSMENT — ANXIETY QUESTIONNAIRES: GAD7 TOTAL SCORE: 6

## 2018-05-22 ENCOUNTER — TELEPHONE (OUTPATIENT)
Dept: FAMILY MEDICINE | Facility: CLINIC | Age: 43
End: 2018-05-22

## 2018-05-22 NOTE — TELEPHONE ENCOUNTER
Summary:    Patient is due/failing the following:   AAP, ACT, DAP and PHQ9    Type of outreach:  Phone, left message for patient to call back.   Action needed: Patient needs to do ACT. / AAP and Patient needs to do PHQ9 and DAP.    ASHKAN Ward, CMA

## 2018-06-25 DIAGNOSIS — F33.0 MAJOR DEPRESSIVE DISORDER, RECURRENT EPISODE, MILD (H): ICD-10-CM

## 2018-06-25 DIAGNOSIS — F41.9 ANXIETY: ICD-10-CM

## 2018-06-25 NOTE — TELEPHONE ENCOUNTER
"Requested Prescriptions   Pending Prescriptions Disp Refills     citalopram (CELEXA) 20 MG tablet [Pharmacy Med Name: CITALOPRAM HBR 20 MG TABLET] 90 tablet 1     Sig: TAKE 1 TABLET (20 MG) BY MOUTH DAILY    SSRIs Protocol Failed    6/25/2018  1:25 AM       Failed - PHQ-9 score less than 5 in past 6 months    Please review last PHQ-9 score.          Failed - Recent (6 mo) or future (30 days) visit within the authorizing provider's specialty    Patient had office visit in the last 6 months or has a visit in the next 30 days with authorizing provider or within the authorizing provider's specialty.  See \"Patient Info\" tab in inbasket, or \"Choose Columns\" in Meds & Orders section of the refill encounter.           Passed - Patient is age 18 or older        "

## 2018-06-26 RX ORDER — CITALOPRAM HYDROBROMIDE 20 MG/1
TABLET ORAL
Qty: 30 TABLET | Refills: 0 | Status: SHIPPED | OUTPATIENT
Start: 2018-06-26 | End: 2018-07-29

## 2018-06-26 NOTE — TELEPHONE ENCOUNTER
Medication is being filled for 1 time refill only due to:  Patient needs to be seen because overdue for 6 month follow up. Last seen 9/15/17.   [employer] message sent to patient asking him to schedule appointment.  Yojana Trevizo RN

## 2018-07-17 ENCOUNTER — MYC REFILL (OUTPATIENT)
Dept: FAMILY MEDICINE | Facility: CLINIC | Age: 43
End: 2018-07-17

## 2018-07-17 DIAGNOSIS — J45.30 MILD PERSISTENT ASTHMA, UNCOMPLICATED: ICD-10-CM

## 2018-07-17 NOTE — TELEPHONE ENCOUNTER
Last ACT done 2/2017  Note from 9/15/17 OV:  Will check LFT's and lipids the week prior to his physical in 6 months.     [employer] message sent to patient asking him to schedule.  Yojana Trevizo RN  Requested Prescriptions   Pending Prescriptions Disp Refills     albuterol (VENTOLIN HFA) 108 (90 Base) MCG/ACT Inhaler 1 Inhaler 3     Sig: Inhale 1-2 puffs into the lungs every 6 hours as needed for shortness of breath / dyspnea or wheezing    There is no refill protocol information for this order        fluticasone (FLOVENT HFA) 110 MCG/ACT Inhaler 1 Inhaler 11     Sig: Inhale 1 puff into the lungs 2 times daily    There is no refill protocol information for this order

## 2018-07-17 NOTE — TELEPHONE ENCOUNTER
Message from MyChart:  Original authorizing provider: Jessika Morales MD    Garrett Hays would like a refill of the following medications:  albuterol (VENTOLIN HFA) 108 (90 BASE) MCG/ACT Inhaler [Jesiska Morales MD]  fluticasone (FLOVENT HFA) 110 MCG/ACT Inhaler [Jessika Morales MD]    Preferred pharmacy: [facility] [id number]    Comment:

## 2018-07-20 RX ORDER — ALBUTEROL SULFATE 90 UG/1
1-2 AEROSOL, METERED RESPIRATORY (INHALATION) EVERY 6 HOURS PRN
Qty: 6.7 G | Refills: 0 | Status: SHIPPED | OUTPATIENT
Start: 2018-07-20 | End: 2018-11-08

## 2018-07-20 RX ORDER — FLUTICASONE PROPIONATE 110 UG/1
1 AEROSOL, METERED RESPIRATORY (INHALATION) 2 TIMES DAILY
Qty: 1 INHALER | Refills: 0 | Status: SHIPPED | OUTPATIENT
Start: 2018-07-20 | End: 2018-09-01

## 2018-07-20 NOTE — TELEPHONE ENCOUNTER
Medication is being filled for 1 time refill only due to:  upcoming appt   Pt informed Rx's sent   Sherly SANTANA RN    Next 5 appointments (look out 90 days)     Aug 15, 2018  8:30 AM CDT   PHYSICAL with Jessika Morales MD   [facility] ([facility])    [address] [facility] [address]   [phone]

## 2018-07-29 DIAGNOSIS — F41.9 ANXIETY: ICD-10-CM

## 2018-07-29 DIAGNOSIS — F33.0 MAJOR DEPRESSIVE DISORDER, RECURRENT EPISODE, MILD (H): ICD-10-CM

## 2018-07-30 RX ORDER — CITALOPRAM HYDROBROMIDE 20 MG/1
20 TABLET ORAL DAILY
Qty: 30 TABLET | Refills: 0 | Status: SHIPPED | OUTPATIENT
Start: 2018-07-30 | End: 2018-08-15

## 2018-07-30 NOTE — TELEPHONE ENCOUNTER
"Medication is being filled for 1 time refill only due to:  Patient needs to be seen because Overdue for 6 month f/u with CW.   Future OV with CW 8/15.  Yojana Trevizo RN    Requested Prescriptions   Signed Prescriptions Disp Refills     citalopram (CELEXA) 20 MG tablet 30 tablet 0     Sig: Take 1 tablet (20 mg) by mouth daily    SSRIs Protocol Failed    7/29/2018  1:30 AM       Failed - PHQ-9 score less than 5 in past 6 months    Please review last PHQ-9 score.          Passed - Patient is age 18 or older       Passed - Recent (6 mo) or future (30 days) visit within the authorizing provider's specialty    Patient had office visit in the last 6 months or has a visit in the next 30 days with authorizing provider or within the authorizing provider's specialty.  See \"Patient Info\" tab in inbasket, or \"Choose Columns\" in Meds & Orders section of the refill encounter.                "

## 2018-08-15 ENCOUNTER — OFFICE VISIT (OUTPATIENT)
Dept: FAMILY MEDICINE | Facility: CLINIC | Age: 43
End: 2018-08-15
Payer: COMMERCIAL

## 2018-08-15 VITALS
BODY MASS INDEX: 28.08 KG/M2 | HEIGHT: 68 IN | TEMPERATURE: 99 F | HEART RATE: 84 BPM | WEIGHT: 185.3 LBS | DIASTOLIC BLOOD PRESSURE: 92 MMHG | SYSTOLIC BLOOD PRESSURE: 136 MMHG | OXYGEN SATURATION: 96 %

## 2018-08-15 DIAGNOSIS — J45.30 MILD PERSISTENT ASTHMA, UNCOMPLICATED: ICD-10-CM

## 2018-08-15 DIAGNOSIS — K21.9 GASTROESOPHAGEAL REFLUX DISEASE WITHOUT ESOPHAGITIS: ICD-10-CM

## 2018-08-15 DIAGNOSIS — F41.9 ANXIETY: ICD-10-CM

## 2018-08-15 DIAGNOSIS — Z13.6 CARDIOVASCULAR SCREENING; LDL GOAL LESS THAN 160: ICD-10-CM

## 2018-08-15 DIAGNOSIS — R79.89 ELEVATED LFTS: ICD-10-CM

## 2018-08-15 DIAGNOSIS — Z00.00 ENCOUNTER FOR ROUTINE ADULT HEALTH EXAMINATION WITHOUT ABNORMAL FINDINGS: Primary | ICD-10-CM

## 2018-08-15 DIAGNOSIS — F33.0 MAJOR DEPRESSIVE DISORDER, RECURRENT EPISODE, MILD (H): ICD-10-CM

## 2018-08-15 DIAGNOSIS — R03.0 ELEVATED BLOOD PRESSURE READING WITHOUT DIAGNOSIS OF HYPERTENSION: ICD-10-CM

## 2018-08-15 LAB
ALBUMIN SERPL-MCNC: 4.2 G/DL (ref 3.4–5)
ALP SERPL-CCNC: 78 U/L (ref 40–150)
ALT SERPL W P-5'-P-CCNC: 170 U/L (ref 0–70)
ANION GAP SERPL CALCULATED.3IONS-SCNC: 9 MMOL/L (ref 3–14)
AST SERPL W P-5'-P-CCNC: 75 U/L (ref 0–45)
BILIRUB SERPL-MCNC: 0.6 MG/DL (ref 0.2–1.3)
BUN SERPL-MCNC: 15 MG/DL (ref 7–30)
CALCIUM SERPL-MCNC: 9 MG/DL (ref 8.5–10.1)
CHLORIDE SERPL-SCNC: 103 MMOL/L (ref 94–109)
CHOLEST SERPL-MCNC: 211 MG/DL
CO2 SERPL-SCNC: 27 MMOL/L (ref 20–32)
CREAT SERPL-MCNC: 0.86 MG/DL (ref 0.66–1.25)
CREAT UR-MCNC: 178 MG/DL
GFR SERPL CREATININE-BSD FRML MDRD: >90 ML/MIN/1.7M2
GLUCOSE SERPL-MCNC: 96 MG/DL (ref 70–99)
HDLC SERPL-MCNC: 57 MG/DL
HIV 1+2 AB+HIV1 P24 AG SERPL QL IA: NONREACTIVE
LDLC SERPL CALC-MCNC: 110 MG/DL
MICROALBUMIN UR-MCNC: 42 MG/L
MICROALBUMIN/CREAT UR: 23.88 MG/G CR (ref 0–17)
NONHDLC SERPL-MCNC: 154 MG/DL
POTASSIUM SERPL-SCNC: 3.8 MMOL/L (ref 3.4–5.3)
PROT SERPL-MCNC: 8.2 G/DL (ref 6.8–8.8)
SODIUM SERPL-SCNC: 139 MMOL/L (ref 133–144)
TRIGL SERPL-MCNC: 221 MG/DL

## 2018-08-15 PROCEDURE — 82043 UR ALBUMIN QUANTITATIVE: CPT | Performed by: FAMILY MEDICINE

## 2018-08-15 PROCEDURE — 80061 LIPID PANEL: CPT | Performed by: FAMILY MEDICINE

## 2018-08-15 PROCEDURE — 36415 COLL VENOUS BLD VENIPUNCTURE: CPT | Performed by: FAMILY MEDICINE

## 2018-08-15 PROCEDURE — 87389 HIV-1 AG W/HIV-1&-2 AB AG IA: CPT | Performed by: FAMILY MEDICINE

## 2018-08-15 PROCEDURE — 99396 PREV VISIT EST AGE 40-64: CPT | Performed by: FAMILY MEDICINE

## 2018-08-15 PROCEDURE — 99213 OFFICE O/P EST LOW 20 MIN: CPT | Mod: 25 | Performed by: FAMILY MEDICINE

## 2018-08-15 PROCEDURE — 99207 ZZC FOR CODING REVIEW: CPT | Mod: 25 | Performed by: FAMILY MEDICINE

## 2018-08-15 PROCEDURE — 80053 COMPREHEN METABOLIC PANEL: CPT | Performed by: FAMILY MEDICINE

## 2018-08-15 RX ORDER — CITALOPRAM HYDROBROMIDE 20 MG/1
20 TABLET ORAL DAILY
Qty: 90 TABLET | Refills: 1 | Status: SHIPPED | OUTPATIENT
Start: 2018-08-15

## 2018-08-15 ASSESSMENT — PATIENT HEALTH QUESTIONNAIRE - PHQ9: 5. POOR APPETITE OR OVEREATING: SEVERAL DAYS

## 2018-08-15 ASSESSMENT — ANXIETY QUESTIONNAIRES
2. NOT BEING ABLE TO STOP OR CONTROL WORRYING: SEVERAL DAYS
3. WORRYING TOO MUCH ABOUT DIFFERENT THINGS: SEVERAL DAYS
IF YOU CHECKED OFF ANY PROBLEMS ON THIS QUESTIONNAIRE, HOW DIFFICULT HAVE THESE PROBLEMS MADE IT FOR YOU TO DO YOUR WORK, TAKE CARE OF THINGS AT HOME, OR GET ALONG WITH OTHER PEOPLE: NOT DIFFICULT AT ALL
7. FEELING AFRAID AS IF SOMETHING AWFUL MIGHT HAPPEN: SEVERAL DAYS
5. BEING SO RESTLESS THAT IT IS HARD TO SIT STILL: NOT AT ALL
GAD7 TOTAL SCORE: 5
1. FEELING NERVOUS, ANXIOUS, OR ON EDGE: SEVERAL DAYS
6. BECOMING EASILY ANNOYED OR IRRITABLE: NOT AT ALL

## 2018-08-15 NOTE — LETTER
My Asthma Action Plan  Name: Garrett Hays   YOB: 1975  Date: 8/15/2018   My doctor: Jessika Morales MD   My clinic: [facility]        My Control Medicine: Fluticasone propionate (Flovent) -   mcg 1 puff twice daily  My Rescue Medicine: Albuterol (Proair/Ventolin/Proventil) inhaler 1-2 puffs as needed   My Asthma Severity: mild persistent  Avoid your asthma triggers: smoke, upper respiratory infections, pollens, exercise or sports and cold air and reflux           GREEN ZONE   Good Control    I feel good    No cough or wheeze    Can work, sleep and play without asthma symptoms       Take your asthma control medicine every day.     1. If exercise triggers your asthma, take your rescue medication    15 minutes before exercise or sports, and    During exercise if you have asthma symptoms  2. Spacer to use with inhaler: If you have a spacer, make sure to use it with your inhaler             YELLOW ZONE Getting Worse  I have ANY of these:    I do not feel good    Cough or wheeze    Chest feels tight    Wake up at night   1. Keep taking your Green Zone medications  2. Start taking your rescue medicine:    every 20 minutes for up to 1 hour. Then every 4 hours for 24-48 hours.  3. If you stay in the Yellow Zone for more than 12-24 hours, contact your doctor.  4. If you do not return to the Green Zone in 12-24 hours or you get worse, start taking your oral steroid medicine if prescribed by your provider.           RED ZONE Medical Alert - Get Help  I have ANY of these:    I feel awful    Medicine is not helping    Breathing getting harder    Trouble walking or talking    Nose opens wide to breathe       1. Take your rescue medicine NOW  2. If your provider has prescribed an oral steroid medicine, start taking it NOW  3. Call your doctor NOW  4. If you are still in the Red Zone after 20 minutes and you have not reached your doctor:    Take your rescue medicine again and    Call 501  or go to the emergency room right away    See your regular doctor within 2 weeks of an Emergency Room or Urgent Care visit for follow-up treatment.          Annual Reminders:  Meet with Asthma Educator,  Flu Shot in the Fall, consider Pneumonia Vaccination for patients with asthma (aged 19 and older).    Pharmacy:    [id number] - [place], [place] - [address] AT [facility] [address]  [facility] [address] [facility] - [place], [place] - [address] [facility] [address]                      Asthma Triggers  How To Control Things That Make Your Asthma Worse    Triggers are things that make your asthma worse.  Look at the list below to help you find your triggers and what you can do about them.  You can help prevent asthma flare-ups by staying away from your triggers.      Trigger                                                          What you can do   Cigarette Smoke  Tobacco smoke can make asthma worse. Do not allow smoking in your home, car or around you.  Be sure no one smokes at a child s day care or school.  If you smoke, ask your health care provider for ways to help you quit.  Ask family members to quit too.  Ask your health care provider for a referral to Quit Plan to help you quit smoking, or call 0-912-639-PLAN.     Colds, Flu, Bronchitis  These are common triggers of asthma. Wash your hands often.  Don t touch your eyes, nose or mouth.  Get a flu shot every year.     Dust Mites  These are tiny bugs that live in cloth or carpet. They are too small to see. Wash sheets and blankets in hot water every week.   Encase pillows and mattress in dust mite proof covers.  Avoid having carpet if you can. If you have carpet, vacuum weekly.   Use a dust mask and HEPA vacuum.   Pollen and Outdoor Mold  Some people are allergic to trees, grass, or weed pollen, or molds. Try to keep your windows closed.  Limit time out doors when pollen count is high.   Ask you health care provider about taking medicine during allergy  season.     Animal Dander  Some people are allergic to skin flakes, urine or saliva from pets with fur or feathers. Keep pets with fur or feathers out of your home.    If you can t keep the pet outdoors, then keep the pet out of your bedroom.  Keep the bedroom door closed.  Keep pets off cloth furniture and away from stuffed toys.     Mice, Rats, and Cockroaches  Some people are allergic to the waste from these pests.   Cover food and garbage.  Clean up spills and food crumbs.  Store grease in the refrigerator.   Keep food out of the bedroom.   Indoor Mold  This can be a trigger if your home has high moisture. Fix leaking faucets, pipes, or other sources of water.   Clean moldy surfaces.  Dehumidify basement if it is damp and smelly.   Smoke, Strong Odors, and Sprays  These can reduce air quality. Stay away from strong odors and sprays, such as perfume, powder, hair spray, paints, smoke incense, paint, cleaning products, candles and new carpet.   Exercise or Sports  Some people with asthma have this trigger. Be active!  Ask your doctor about taking medicine before sports or exercise to prevent symptoms.    Warm up for 5-10 minutes before and after sports or exercise.     Other Triggers of Asthma  Cold air:  Cover your nose and mouth with a scarf.  Sometimes laughing or crying can be a trigger.  Some medicines and food can trigger asthma.

## 2018-08-15 NOTE — NURSING NOTE
"Chief Complaint   Patient presents with     Physical     BP (!) 136/92  Pulse 84  Temp 99  F (37.2  C) (Oral)  Ht 5' 7.75\" (1.721 m)  Wt 185 lb 4.8 oz (84.1 kg)  SpO2 96%  BMI 28.38 kg/m2 Estimated body mass index is 28.38 kg/(m^2) as calculated from the following:    Height as of this encounter: 5' 7.75\" (1.721 m).    Weight as of this encounter: 185 lb 4.8 oz (84.1 kg).        Health Maintenance due pending provider review:  PHQ9 and ACT    Act compelted phq given    Ching Berry CMA  "

## 2018-08-15 NOTE — MR AVS SNAPSHOT
After Visit Summary   8/15/2018    Garrett Hays    MRN: [id number]           Patient Information     Date Of Birth          1975        Visit Information        Provider Department      8/15/2018 8:30 AM Jessika Morales MD [facility]        Today's Diagnoses     Encounter for routine adult health examination without abnormal findings    -  1    Elevated LFTs        Mild persistent asthma, uncomplicated        Gastroesophageal reflux disease without esophagitis        Major depressive disorder, recurrent episode, mild (H)        Anxiety        CARDIOVASCULAR SCREENING; LDL GOAL LESS THAN 160        Elevated blood pressure reading without diagnosis of hypertension          Care Instructions      Preventive Health Recommendations  Male Ages 40 to 49    Yearly exam:             See your health care provider every year in order to  o   Review health changes.   o   Discuss preventive care.    o   Review your medicines if your doctor has prescribed any.    You should be tested each year for STDs (sexually transmitted diseases) if you re at risk.     Have a cholesterol test every 5 years.     Have a colonoscopy (test for colon cancer) if someone in your family has had colon cancer or polyps before age 50.     After age 45, have a diabetes test (fasting glucose). If you are at risk for diabetes, you should have this test every 3 years.      Talk with your health care provider about whether or not a prostate cancer screening test (PSA) is right for you.    Shots: Get a flu shot each year. Get a tetanus shot every 10 years.     Nutrition:    Eat at least 5 servings of fruits and vegetables daily.     Eat whole-grain bread, whole-wheat pasta and brown rice instead of white grains and rice.     Get adequate Calcium and Vitamin D.     Lifestyle    Exercise for at least 150 minutes a week (30 minutes a day, 5 days a week). This will help you control your weight and prevent disease.  "    Limit alcohol to one drink per day.     No smoking.     Wear sunscreen to prevent skin cancer.     See your dentist every six months for an exam and cleaning.              Follow-ups after your visit        Who to contact     If you have questions or need follow up information about today's clinic visit or your schedule please contact [facility] directly at [phone].  Normal or non-critical lab and imaging results will be communicated to you by [employer], letter or phone within 4 business days after the clinic has received the results. If you do not hear from us within 7 days, please contact the clinic through [employer] or phone. If you have a critical or abnormal lab result, we will notify you by phone as soon as possible.  Submit refill requests through [employer] or call your pharmacy and they will forward the refill request to us. Please allow 3 business days for your refill to be completed.          Additional Information About Your Visit        [employer] Information     [employer] gives you secure access to your electronic health record. If you see a primary care provider, you can also send messages to your care team and make appointments. If you have questions, please call your primary care clinic.  If you do not have a primary care provider, please call [phone] and they will assist you.        Care EveryWhere ID     This is your Care EveryWhere ID. This could be used by other organizations to access your [place] medical records  [id number]        Your Vitals Were     Pulse Temperature Height Pulse Oximetry BMI (Body Mass Index)       84 99  F (37.2  C) (Oral) 5' 7.75\" (1.721 m) 96% 28.38 kg/m2        Blood Pressure from Last 3 Encounters:   08/15/18 (!) 136/92   09/15/17 124/86   02/22/17 127/84    Weight from Last 3 Encounters:   08/15/18 185 lb 4.8 oz (84.1 kg)   09/15/17 184 lb 3.2 oz (83.6 kg)   02/22/17 186 lb 1.6 oz (84.4 kg)              We Performed the Following     Albumin Random " Urine Quantitative with Creat Ratio     Comprehensive metabolic panel (BMP + Alb, Alk Phos, ALT, AST, Total. Bili, TP)     HIV Antigen Antibody Combo     Lipid panel reflex to direct LDL Fasting          Where to get your medicines      These medications were sent to [facility] [id number] [facility] - [place], [place] - [address]  [address] [address]     Phone:  [phone]     citalopram 20 MG tablet          Primary Care Provider Office Phone # Fax #    Jessika Morales [phone]       [address] [address]        Equal Access to Services     [facility] : Hadii aad ku hadasho Soomaali, waaxda luqadaha, qaybta kaalmada adeegyada, waxportia leain hayaan jose angel ramsey . So [facility] [phone].    ATENCIÓN: Si habla español, tiene a neff disposición servicios gratuitos de asistencia lingüística. Llame al [phone].    We comply with applicable federal civil rights laws and [place] laws. We do not discriminate on the basis of race, color, national origin, age, disability, sex, sexual orientation, or gender identity.            Thank you!     Thank you for choosing [facility]  for your care. Our goal is always to provide you with excellent care. Hearing back from our patients is one way we can continue to improve our services. Please take a few minutes to complete the written survey that you may receive in the mail after your visit with us. Thank you!             Your Updated Medication List - Protect others around you: Learn how to safely use, store and throw away your medicines at www.disposemymeds.org.          This list is accurate as of 8/15/18  9:21 AM.  Always use your most recent med list.                   Brand Name Dispense Instructions for use Diagnosis    albuterol 108 (90 Base) MCG/ACT inhaler    VENTOLIN HFA    6.7 g    Inhale 1-2 puffs into the lungs every 6 hours as needed for shortness of breath / dyspnea or  wheezing    Mild persistent asthma, uncomplicated       citalopram 20 MG tablet    celeXA    90 tablet    Take 1 tablet (20 mg) by mouth daily    Anxiety, Major depressive disorder, recurrent episode, mild (H)       EPINEPHrine 0.3 MG/0.3ML injection 2-pack    EPIPEN/ADRENACLICK/or ANY BX GENERIC EQUIV    0.6 mL    Inject 0.3 mLs (0.3 mg) into the muscle as needed for anaphylaxis    Bee sting allergy       fluticasone 110 MCG/ACT Inhaler    FLOVENT HFA    1 Inhaler    Inhale 1 puff into the lungs 2 times daily    Mild persistent asthma, uncomplicated       omeprazole 20 MG tablet     90 tablet    Take 1 tablet (20 mg) by mouth daily Take 30-60 minutes before a meal.    Gastroesophageal reflux disease without esophagitis

## 2018-08-15 NOTE — PROGRESS NOTES
SUBJECTIVE:   CC: Garrett Hays is an 42 year old male who presents for preventative health visit.     Physical   Annual:     Getting at least 3 servings of Calcium per day:  Yes    Bi-annual eye exam:  NO    Dental care twice a year:  Yes    Sleep apnea or symptoms of sleep apnea:  None    Diet:  Regular (no restrictions)    Frequency of exercise:  2-3 days/week    Duration of exercise:  45-60 minutes    Taking medications regularly:  Yes    Medication side effects:  None    Additional concerns today:  No    Etoh-   3 glasses of wine, 3-4 nights/wk.  Up from 2-3x/wk at last check-in, down from the bottle of wine and glass of whiskey he has stated he drank for yrs at previous appts.  Wife drinks less- states she is not concerned about his intake, he isn't currently, though he does worry about his elevated liver function tests.  Is aware that they have gone down some when he has been drinking less (like at last appt).    MDD- has been on an serotonin specific reuptake inhibitor since around 21 yrs of age.  On/off depression sx's prior, strong fam h/o (mother - on meds, unsure if sister is on meds).  No suicide in the family.  Pt had SI thoughts a long time ago, none for awhile.     Asthma- good control.  Using flovent once a day, really helps, and then rare use of albuterol.  Allergies- worse lately, tore up their deck, eyes dry and scratchy, throat was irritated, sinus congestion and pressure.  Used visine eye drops.    GERD- taking prilosec once every couple days.    Worse if red sauce.  Mostly has sx's when awake and upright.  Will try zantac/tums to see if those control sx's fine.      Today's PHQ-2 Score:   PHQ-2 ( 1999 Pf[employer]) 8/13/2018   Q1: Little interest or pleasure in doing things 0   Q2: Feeling down, depressed or hopeless 0   PHQ-2 Score 0   Q1: Little interest or pleasure in doing things Not at all   Q2: Feeling down, depressed or hopeless Not at all   PHQ-2 Score 0       Abuse: Current or  Past(Physical, Sexual or Emotional)- NO  Do you feel safe in your environment - YES    Social History   Substance Use Topics     Smoking status: Former Smoker     Quit date: 10/14/2013     Smokeless tobacco: Never Used      Comment: 6-7 cig/day     Alcohol use 0.0 oz/week     0 Standard drinks or equivalent per week      Comment: Couple glass of wine per week.     Alcohol Use 2018   If you drink alcohol do you typically have greater than 3 drinks per day OR greater than 7 drinks per week? No   No flowsheet data found.    Last PSA: No results found for: PSA    Reviewed orders with patient. Reviewed health maintenance and updated orders accordingly - Yes  Labs reviewed in EPIC  BP Readings from Last 3 Encounters:   08/15/18 (!) 136/92   09/15/17 124/86   17 127/84    Wt Readings from Last 3 Encounters:   08/15/18 185 lb 4.8 oz (84.1 kg)   09/15/17 184 lb 3.2 oz (83.6 kg)   17 186 lb 1.6 oz (84.4 kg)                  Patient Active Problem List   Diagnosis     CARDIOVASCULAR SCREENING; LDL GOAL LESS THAN 160     Anxiety     Seizure disorder (H)     Elevated LFTs     Mild persistent asthma, uncomplicated     Gastroesophageal reflux disease without esophagitis     Major depressive disorder, recurrent episode, mild (H)     Bee sting allergy     Past Surgical History:   Procedure Laterality Date     TONSILLECTOMY  3-4 years old       Social History   Substance Use Topics     Smoking status: Former Smoker     Quit date: 10/14/2013     Smokeless tobacco: Never Used      Comment: 6-7 cig/day     Alcohol use 0.0 oz/week     0 Standard drinks or equivalent per week      Comment: Couple glass of wine per week.     Family History   Problem Relation Age of Onset     Alcohol/Drug Father      Depression Father      Allergies Mother      Depression Mother      Eye Disorder Mother      Can't see well out of her left eye,      Myocardial Infarction Maternal Grandfather       age 49         Current Outpatient  Prescriptions   Medication Sig Dispense Refill     albuterol (VENTOLIN HFA) 108 (90 Base) MCG/ACT Inhaler Inhale 1-2 puffs into the lungs every 6 hours as needed for shortness of breath / dyspnea or wheezing 6.7 g 0     citalopram (CELEXA) 20 MG tablet Take 1 tablet (20 mg) by mouth daily 90 tablet 1     EPINEPHrine (EPIPEN/ADRENACLICK/OR ANY BX GENERIC EQUIV) 0.3 MG/0.3ML injection 2-pack Inject 0.3 mLs (0.3 mg) into the muscle as needed for anaphylaxis 0.6 mL 1     fluticasone (FLOVENT HFA) 110 MCG/ACT Inhaler Inhale 1 puff into the lungs 2 times daily 1 Inhaler 0     omeprazole 20 MG tablet Take 1 tablet (20 mg) by mouth daily Take 30-60 minutes before a meal. 90 tablet 1     [DISCONTINUED] citalopram (CELEXA) 20 MG tablet Take 1 tablet (20 mg) by mouth daily 30 tablet 0     Allergies   Allergen Reactions     Aspirin      Was young when he had the reastion, not sure what happens.     Bee Venom Difficulty breathing     Last sting as a teen, difficulty breathing     Recent Labs   Lab Test  02/22/17   0917  02/08/17   0952  10/16/15   1347  03/14/12   0849   01/25/11   1114   LDL   --    --    --   117   --   142*   HDL   --    --    --   50   --   52   TRIG   --    --    --   226*   --   237*   ALT  129*  147*  262*  103*   < >   --    CR  0.94  0.71   --    --    < >   --    GFRESTIMATED  89  >90  Non  GFR Calc     --    --    < >   --    GFRESTBLACK  >90   GFR Calc    >90   GFR Calc     --    --    < >   --    POTASSIUM  4.4  4.0   --    --    < >   --     < > = values in this interval not displayed.        Reviewed and updated as needed this visit by clinical staff  Tobacco  Allergies  Meds  Problems  Med Hx  Surg Hx  Fam Hx  Soc Hx          Reviewed and updated as needed this visit by Provider  Allergies  Meds  Problems  Fam Hx            Review of Systems  10 point ROS of systems including Constitutional, Eyes, Respiratory, Cardiovascular,  "Gastroenterology, Genitourinary, Integumentary, Muscularskeletal, Psychiatric were all negative except for pertinent positives noted in my HPI.      OBJECTIVE:   BP (!) 136/92  Pulse 84  Temp 99  F (37.2  C) (Oral)  Ht 5' 7.75\" (1.721 m)  Wt 185 lb 4.8 oz (84.1 kg)  SpO2 96%  BMI 28.38 kg/m2    Physical Exam  GENERAL: healthy, alert and no distress  EYES: Eyes grossly normal to inspection, PERRL and conjunctivae and sclerae normal  HENT: ear canals and TM's normal, nose and mouth without ulcers or lesions  NECK: no adenopathy, no asymmetry, masses, or scars and thyroid normal to palpation  RESP: lungs clear to auscultation - no rales, rhonchi or wheezes  CV: regular rate and rhythm, normal S1 S2, no S3 or S4, no murmur, click or rub, no peripheral edema and peripheral pulses strong  ABDOMEN: soft, nontender, no hepatosplenomegaly, no masses and bowel sounds normal  MS: no gross musculoskeletal defects noted, no edema  SKIN: no suspicious lesions or rashes  NEURO: Normal strength and tone, mentation intact and speech normal  PSYCH: mentation appears normal, affect normal/bright      ASSESSMENT/PLAN:       ICD-10-CM    1. Encounter for routine adult health examination without abnormal findings Z00.00 Comprehensive metabolic panel (BMP + Alb, Alk Phos, ALT, AST, Total. Bili, TP)     HIV Antigen Antibody Combo   2. Elevated LFTs R79.89 Comprehensive metabolic panel (BMP + Alb, Alk Phos, ALT, AST, Total. Bili, TP)   3. Mild persistent asthma, uncomplicated J45.30    4. Gastroesophageal reflux disease without esophagitis K21.9 Comprehensive metabolic panel (BMP + Alb, Alk Phos, ALT, AST, Total. Bili, TP)   5. Major depressive disorder, recurrent episode, mild (H) F33.0 citalopram (CELEXA) 20 MG tablet   6. Anxiety F41.9 citalopram (CELEXA) 20 MG tablet   7. CARDIOVASCULAR SCREENING; LDL GOAL LESS THAN 160 Z13.6 Lipid panel reflex to direct LDL Fasting   8. Elevated blood pressure reading without diagnosis of " "hypertension R03.0 Albumin Random Urine Quantitative with Creat Ratio     CPE- Discussed diet, calcium and exercise.  Eyes and Teeth or UTD or recommended f/u.  No immunizations needed today.  See orders below for tests ordered and screening needed.      Etoh/Elevated LFT's- LFT's a bit better at last check, when he was drinking less than he had in the past, but not normal.  Etoh intake a bit up in general from then, but has not drank much at all in the past two weeks, so will see where his LFT's are today.  He will try to work on cutting back on etoh in general, and work on weight loss as we discussed the two main causes of LFT elevations are etoh and fatty liver.    MDD/Anxiety- doing well, no MDD sx's, mild anxiety sx's.  Has been on an serotonin specific reuptake inhibitor for ~20 yrs, and has never tried off to his recollection.  Discussed he could try off at some point and see how he does.  He'll consider, and we'll discuss more at his 6mo f/u appt.  Will continue citalopram 20mg/d for now - refills sent.    Elevated BP- BP with high diastolic today, even higher on recheck at 102 (136/102).  He has a BP monitor at home- will start checking and rtc sooner if >140/90 consistently.  Discussed cutting back on salt, more fruits/vegetables, and losing weight- all would likely help his BP.  Discussed long-term risks, and that we would likely start meds if BP's remained high.  Will check HTN labs today, and fasting lipids.      COUNSELING:   Reviewed preventive health counseling, as reflected in patient instructions    BP Readings from Last 1 Encounters:   08/15/18 (!) 136/92     Estimated body mass index is 28.38 kg/(m^2) as calculated from the following:    Height as of this encounter: 5' 7.75\" (1.721 m).    Weight as of this encounter: 185 lb 4.8 oz (84.1 kg).    BP Screening:   Last 3 BP Readings:    BP Readings from Last 3 Encounters:   08/15/18 (!) 136/92   09/15/17 124/86   02/22/17 127/84       The following " was recommended to the patient:  Re-screen within 4 weeks and recommend lifestyle modifications  Weight management plan: Discussed healthy diet and exercise guidelines and patient will follow up in 6 months in clinic to re-evaluate.     reports that he quit smoking about 4 years ago. He has never used smokeless tobacco.      Counseling Resources:  ATP IV Guidelines  Pooled Cohorts Equation Calculator  FRAX Risk Assessment  ICSI Preventive Guidelines  Dietary Guidelines for Americans, 2010  USDA's MyPlate  ASA Prophylaxis  Lung CA Screening    Jessika Morales MD  [facility]

## 2018-08-16 ASSESSMENT — PATIENT HEALTH QUESTIONNAIRE - PHQ9: SUM OF ALL RESPONSES TO PHQ QUESTIONS 1-9: 0

## 2018-08-16 ASSESSMENT — ANXIETY QUESTIONNAIRES: GAD7 TOTAL SCORE: 5

## 2018-08-16 ASSESSMENT — ASTHMA QUESTIONNAIRES: ACT_TOTALSCORE: 22

## 2018-08-17 NOTE — PROGRESS NOTES
Here are your lab results from your recent visit...  -Your HIV test was negative.  -Your CMP (which includes electrolyte levels, blood sugar levels, and kidney and liver function tests) looks normal except for the liver function tests which look a bit worse again (possibly due to increased alcohol intake compared to the last check).  Let's check these again at your next visit and see if it goes down if you can decrease your alcohol intake.  -Your microalbumin level (which is the urine test that can signal signs of early chronic kidney disease if elevated to >30) is okay but slightly elevated, possibly due to your blood pressure going higher lately.  -Your cholesterol panel looks slightly abnormal with a just slightly higher LDL (the bad cholesterol), a good HDL (the good cholesterol), and an elevated triglyceride level (which is usually more of a sign of higher carbohydrates/sugars in your diet).     Please let me know if you have any questions.  Best,   Eduardo Morales MD

## 2018-09-01 DIAGNOSIS — J45.30 MILD PERSISTENT ASTHMA, UNCOMPLICATED: ICD-10-CM

## 2018-09-04 RX ORDER — DEXAMETHASONE 4 MG/1
TABLET ORAL
Qty: 3 INHALER | Refills: 1 | Status: SHIPPED | OUTPATIENT
Start: 2018-09-04 | End: 2018-12-18

## 2018-09-04 NOTE — TELEPHONE ENCOUNTER
"Requested Prescriptions   Pending Prescriptions Disp Refills     FLOVENT  MCG/ACT Inhaler [Pharmacy Med Name: FLOVENT  MCG INHALER] 12 Inhaler 0     Sig: TAKE 1 PUFF BY MOUTH TWICE A DAY    Inhaled Steroids Protocol Passed    9/1/2018  2:55 PM       Passed - Patient is age 12 or older       Passed - Asthma control assessment score within normal limits in last 6 months    Please review ACT score.          Passed - Recent (6 mo) or future (30 days) visit within the authorizing provider's specialty    Patient had office visit in the last 6 months or has a visit in the next 30 days with authorizing provider or within the authorizing provider's specialty.  See \"Patient Info\" tab in inbasket, or \"Choose Columns\" in Meds & Orders section of the refill encounter.              Prescription approved per [facility] Refill Protocol.  "

## 2018-11-08 ENCOUNTER — MYC REFILL (OUTPATIENT)
Dept: FAMILY MEDICINE | Facility: CLINIC | Age: 43
End: 2018-11-08

## 2018-11-08 DIAGNOSIS — J45.30 MILD PERSISTENT ASTHMA, UNCOMPLICATED: ICD-10-CM

## 2018-11-08 RX ORDER — ALBUTEROL SULFATE 90 UG/1
1-2 AEROSOL, METERED RESPIRATORY (INHALATION) EVERY 6 HOURS PRN
Qty: 6.7 G | Refills: 1 | Status: SHIPPED | OUTPATIENT
Start: 2018-11-08 | End: 2018-12-18

## 2018-11-08 NOTE — TELEPHONE ENCOUNTER
Message from MyChart:  Original authorizing provider: Jessika Morales MD    Garrett Barriost would like a refill of the following medications:  albuterol (VENTOLIN HFA) 108 (90 Base) MCG/ACT Inhaler [Jessika Morales MD]    Preferred pharmacy: [facility] [address] [place], [place] - [address]    Comment:

## 2018-11-08 NOTE — TELEPHONE ENCOUNTER
"Prescription approved per [facility] Refill Protocol.  Yojana Trevizo RN    Requested Prescriptions   Signed Prescriptions Disp Refills     albuterol (VENTOLIN HFA) 108 (90 Base) MCG/ACT inhaler 6.7 g 1     Sig: Inhale 1-2 puffs into the lungs every 6 hours as needed for shortness of breath / dyspnea or wheezing    Asthma Maintenance Inhalers - Anticholinergics Passed    11/8/2018  8:29 AM       Passed - Patient is age 12 years or older       Passed - Asthma control assessment score within normal limits in last 6 months    Please review ACT score.          Passed - Recent (6 mo) or future (30 days) visit within the authorizing provider's specialty    Patient had office visit in the last 6 months or has a visit in the next 30 days with authorizing provider or within the authorizing provider's specialty.  See \"Patient Info\" tab in inbasket, or \"Choose Columns\" in Meds & Orders section of the refill encounter.              "

## 2018-12-18 ENCOUNTER — MYC REFILL (OUTPATIENT)
Dept: FAMILY MEDICINE | Facility: CLINIC | Age: 43
End: 2018-12-18

## 2018-12-18 DIAGNOSIS — J45.30 MILD PERSISTENT ASTHMA, UNCOMPLICATED: ICD-10-CM

## 2018-12-19 RX ORDER — ALBUTEROL SULFATE 90 UG/1
1-2 AEROSOL, METERED RESPIRATORY (INHALATION) EVERY 6 HOURS PRN
Qty: 6.7 G | Refills: 1 | Status: SHIPPED | OUTPATIENT
Start: 2018-12-19

## 2018-12-19 RX ORDER — FLUTICASONE PROPIONATE 110 UG/1
1 AEROSOL, METERED RESPIRATORY (INHALATION) 2 TIMES DAILY
Qty: 3 INHALER | Refills: 0 | Status: SHIPPED | OUTPATIENT
Start: 2018-12-19

## 2018-12-19 NOTE — TELEPHONE ENCOUNTER
"Prescription approved per [facility] Refill Protocol.  MyChart sent to pt  Sherly SANTANA RN    Requested Prescriptions   Pending Prescriptions Disp Refills     fluticasone (FLOVENT HFA) 110 MCG/ACT inhaler 3 Inhaler 1    Inhaled Steroids Protocol Passed - 12/18/2018  5:23 PM       Passed - Patient is age 12 or older       Passed - Asthma control assessment score within normal limits in last 6 months    Please review ACT score.          Passed - Recent (6 mo) or future (30 days) visit within the authorizing provider's specialty    Patient had office visit in the last 6 months or has a visit in the next 30 days with authorizing provider or within the authorizing provider's specialty.  See \"Patient Info\" tab in inbasket, or \"Choose Columns\" in Meds & Orders section of the refill encounter.            albuterol (VENTOLIN HFA) 108 (90 Base) MCG/ACT inhaler 6.7 g 1     Sig: Inhale 1-2 puffs into the lungs every 6 hours as needed for shortness of breath / dyspnea or wheezing    Asthma Maintenance Inhalers - Anticholinergics Passed - 12/18/2018  5:23 PM       Passed - Patient is age 12 years or older       Passed - Asthma control assessment score within normal limits in last 6 months    Please review ACT score.          Passed - Recent (6 mo) or future (30 days) visit within the authorizing provider's specialty    Patient had office visit in the last 6 months or has a visit in the next 30 days with authorizing provider or within the authorizing provider's specialty.  See \"Patient Info\" tab in inbasket, or \"Choose Columns\" in Meds & Orders section of the refill encounter.                "

## 2019-01-15 DIAGNOSIS — J45.30 MILD PERSISTENT ASTHMA, UNCOMPLICATED: ICD-10-CM

## 2019-01-15 RX ORDER — DEXAMETHASONE 4 MG/1
TABLET ORAL
Qty: 36 INHALER | Refills: 0 | Status: SHIPPED | OUTPATIENT
Start: 2019-01-15

## 2019-01-15 NOTE — TELEPHONE ENCOUNTER
"Prescription approved per [facility] Refill Protocol.  Yojana Trevizo RN    Requested Prescriptions   Signed Prescriptions Disp Refills     FLOVENT  MCG/ACT inhaler 36 Inhaler 0     Sig: INHALE 1 PUFF BY MOUTH TWICE A DAY    Inhaled Steroids Protocol Passed - 1/15/2019  1:32 AM       Passed - Patient is age 12 or older       Passed - Asthma control assessment score within normal limits in last 6 months    Please review ACT score.          Passed - Medication is active on med list       Passed - Recent (6 mo) or future (30 days) visit within the authorizing provider's specialty    Patient had office visit in the last 6 months or has a visit in the next 30 days with authorizing provider or within the authorizing provider's specialty.  See \"Patient Info\" tab in inbasket, or \"Choose Columns\" in Meds & Orders section of the refill encounter.              "

## 2019-03-15 DIAGNOSIS — F41.9 ANXIETY: ICD-10-CM

## 2019-03-15 DIAGNOSIS — F33.0 MAJOR DEPRESSIVE DISORDER, RECURRENT EPISODE, MILD (H): ICD-10-CM

## 2019-03-15 RX ORDER — CITALOPRAM HYDROBROMIDE 20 MG/1
TABLET ORAL
Start: 2019-03-15

## 2019-11-08 ENCOUNTER — HEALTH MAINTENANCE LETTER (OUTPATIENT)
Age: 44
End: 2019-11-08

## 2020-12-06 ENCOUNTER — HEALTH MAINTENANCE LETTER (OUTPATIENT)
Age: 45
End: 2020-12-06

## 2021-09-25 ENCOUNTER — HEALTH MAINTENANCE LETTER (OUTPATIENT)
Age: 46
End: 2021-09-25

## 2022-01-15 ENCOUNTER — HEALTH MAINTENANCE LETTER (OUTPATIENT)
Age: 47
End: 2022-01-15

## 2023-04-22 ENCOUNTER — HEALTH MAINTENANCE LETTER (OUTPATIENT)
Age: 48
End: 2023-04-22
